# Patient Record
Sex: MALE | Race: ASIAN | NOT HISPANIC OR LATINO | Employment: FULL TIME | URBAN - METROPOLITAN AREA
[De-identification: names, ages, dates, MRNs, and addresses within clinical notes are randomized per-mention and may not be internally consistent; named-entity substitution may affect disease eponyms.]

---

## 2019-08-13 ENCOUNTER — COMMUNICATION - HEALTHEAST (OUTPATIENT)
Dept: UROLOGY | Facility: CLINIC | Age: 34
End: 2019-08-13

## 2019-08-16 ENCOUNTER — OFFICE VISIT - HEALTHEAST (OUTPATIENT)
Dept: UROLOGY | Facility: CLINIC | Age: 34
End: 2019-08-16

## 2019-08-16 DIAGNOSIS — N20.1 CALCULUS OF URETER: ICD-10-CM

## 2019-08-16 LAB
ALBUMIN UR-MCNC: NEGATIVE MG/DL
APPEARANCE UR: ABNORMAL
BILIRUB UR QL STRIP: NEGATIVE
COLOR UR AUTO: YELLOW
GLUCOSE UR STRIP-MCNC: NEGATIVE MG/DL
HGB UR QL STRIP: ABNORMAL
KETONES UR STRIP-MCNC: NEGATIVE MG/DL
LEUKOCYTE ESTERASE UR QL STRIP: NEGATIVE
NITRATE UR QL: NEGATIVE
PH UR STRIP: 5.5 [PH] (ref 5–8)
SP GR UR STRIP: 1.02 (ref 1–1.03)
UROBILINOGEN UR STRIP-ACNC: ABNORMAL

## 2019-08-30 ENCOUNTER — OFFICE VISIT - HEALTHEAST (OUTPATIENT)
Dept: UROLOGY | Facility: CLINIC | Age: 34
End: 2019-08-30

## 2019-08-30 DIAGNOSIS — N20.1 CALCULUS OF URETER: ICD-10-CM

## 2019-08-30 LAB
ALBUMIN UR-MCNC: NEGATIVE MG/DL
APPEARANCE UR: CLEAR
BILIRUB UR QL STRIP: NEGATIVE
COLOR UR AUTO: YELLOW
GLUCOSE UR STRIP-MCNC: NEGATIVE MG/DL
HGB UR QL STRIP: ABNORMAL
KETONES UR STRIP-MCNC: NEGATIVE MG/DL
LEUKOCYTE ESTERASE UR QL STRIP: NEGATIVE
NITRATE UR QL: NEGATIVE
PH UR STRIP: 5.5 [PH] (ref 5–8)
SP GR UR STRIP: 1.02 (ref 1–1.03)
UROBILINOGEN UR STRIP-ACNC: ABNORMAL

## 2019-09-13 ENCOUNTER — OFFICE VISIT - HEALTHEAST (OUTPATIENT)
Dept: UROLOGY | Facility: CLINIC | Age: 34
End: 2019-09-13

## 2019-09-13 ENCOUNTER — HOSPITAL ENCOUNTER (OUTPATIENT)
Dept: CT IMAGING | Facility: CLINIC | Age: 34
Discharge: HOME | End: 2019-09-13
Attending: SPECIALIST

## 2019-09-13 DIAGNOSIS — N20.1 CALCULUS OF URETER: ICD-10-CM

## 2019-09-13 DIAGNOSIS — N20.0 CALCULUS OF KIDNEY: ICD-10-CM

## 2021-05-26 VITALS — SYSTOLIC BLOOD PRESSURE: 118 MMHG | HEART RATE: 83 BPM | DIASTOLIC BLOOD PRESSURE: 83 MMHG | TEMPERATURE: 97.9 F

## 2021-05-31 NOTE — PROGRESS NOTES
Assessment/Plan:        Diagnoses and all orders for this visit:    Calculus of ureter  -     Urinalysis Macroscopic  -     Symptom Control While Passing a Stone Education  -     Cancel: CT Abdomen Pelvis Without Oral Without IV Contrast; Future; Expected date: 08/30/2019  -     Stone Analysis - Standing; Standing  -     Patient Stated Goal: Pass my stone      Stone Management Plan  [facility] 8/16/2019   Urinary Tract Infection No suspicion of infection   Renal Colic Well controlled symptoms   Renal Failure No suspicion of renal failure   Current CT date 8/13/2019   Right sided stones? Yes   R Number of ureteral stones No ureteral stones   R Number of kidney stones  2   R GSD of kidney stones < 2   R Hydronephrosis None   R Current Plan Observe   Observe rationale Limited stone burden with good prognosis for spontaneous passage   Left sided stones? Yes   L Number of ureteral stones 1   L GSD of ureteral stones 4   L Location of ureteral stone Proximal   L Number of kidney stones  No renal stones   L Hydronephrosis Mild   L Stone Event New event   Diagnosis date 8/13/2019   Initial location of primary symptomatic stone Proximal   Initial GSD of primary symptomatic stone 4   L MET Status Initiation   L Current Plan MET             Subjective:      HPI  Mr. Elia Warner is a 34 y.o.  male presenting to the [facility] [place] with history of onset of lower back pain with subsequent left flank pain acute on 8/13/2019.  Patient presented to the emergency room where he was found UA specific gravity 1.030 pH 6 greater than 100 RBCs 0-5 WBCs few bacteria negative nitrate.  C-reactive protein 0.2 white blood count 8000 hemoglobin 16.5 sodium 141 potassium 3.6 creatinine 1.14 calcium 9.3.    Patient's pain was controlled and CT scan obtained.    Personal review of CT scan without contrast obtained 8/13/2019 demonstrates a 2.5 x 3 mm stone left proximal ureter just below UPJ.  The right kidney  contains 2 small 1 mm stones in the right.    Patient comes at this time with symptoms completely abated no voiding symptoms no abdominal or flank pain.    UA today specific gravity 1.025 pH 5.5 large blood negative nitrate negative leukocyte.    Should be noted this is patient's first stone and family history is negative for stones.    Impression;    left proximal ureteral stone 2.5 x 3 mm with two 1 mm right renal stones    Plan continue trial of passage patient has protocol meds available to him including acetaminophen Dramamine ibuprofen and oxycodone.  He does not have Zofran and has had no nausea  Follow-up 2 weeks.  No CT has been ordered as anticipate he will pass stone.  If stone is not been recovered decision will be made whether to do CT scan at the time of his return.    Recommended stone risk evaluation upon resolution of the acute process.       ROS   Review of Systems  A 12 point comprehensive review of systems is negative except for HPI    No past medical history on file.    No past surgical history on file.    Current Outpatient Medications   Medication Sig Dispense Refill     acetaminophen (TYLENOL) 500 MG tablet Take 2 tablets (1,000 mg total) by mouth 4 (four) times a day for 7 days. 56 tablet 0     dimenhyDRINATE (DRAMAMINE) 50 MG tablet Take 1 tablet (50 mg total) by mouth at bedtime for 7 days. 7 tablet 0     dimenhyDRINATE (DRAMAMINE) 50 MG tablet Take 1 tablet (50 mg total) by mouth 4 (four) times a day as needed. 28 tablet 0     ibuprofen (ADVIL,MOTRIN) 200 MG tablet Take 2 tablets (400 mg total) by mouth 4 (four) times a day for 7 days. 56 tablet 0     oxyCODONE (ROXICODONE) 5 MG immediate release tablet Every 4-6 hours as needed if pain is not improved with acetaminophen and ibuprofen. 12 tablet 0     No current facility-administered medications for this visit.        No Known Allergies    Social History     Socioeconomic History     Marital status: Single     Spouse name: Not on file      Number of children: Not on file     Years of education: Not on file     Highest education level: Not on file   Occupational History     Not on file   Social Needs     Financial resource strain: Not on file     Food insecurity:     Worry: Not on file     Inability: Not on file     Transportation needs:     Medical: Not on file     Non-medical: Not on file   Tobacco Use     Smoking status: Not on file   Substance and Sexual Activity     Alcohol use: Not on file     Drug use: Not on file     Sexual activity: Not on file   Lifestyle     Physical activity:     Days per week: Not on file     Minutes per session: Not on file     Stress: Not on file   Relationships     Social connections:     Talks on phone: Not on file     Gets together: Not on file     Attends Mosque service: Not on file     Active member of club or organization: Not on file     Attends meetings of clubs or organizations: Not on file     Relationship status: Not on file     Intimate partner violence:     Fear of current or ex partner: Not on file     Emotionally abused: Not on file     Physically abused: Not on file     Forced sexual activity: Not on file   Other Topics Concern     Not on file   Social History Narrative     Not on file       No family history on file.    Objective:      Physical Exam  Vitals:    08/16/19 0851   BP: 116/70   Pulse: 79   Temp: 97.8  F (36.6  C)     General - well developed, well nourished, appropriate for age. Appears no distress at this time   Heart - regular rate and rhythm, no murmur  Respiratory - normal effort, clear to auscultation, good air entry without adventitious noises  Abdomen - slender soft, non-tender, no hepatosplenomegaly, no masses.   - no flank tenderness, no suprapubic tenderness, kidney and bladder non-palpable  MSK - normal spinal curvature. no spinal tenderness. normal gait. muscular strength intact.  Neurology - cranial nerves II-XII grossly intact, normal sensation, no unsteadiness  Skin -  intact, no bruising, no gouty tophi  Psych - oriented to time, place, and person, normal mood and affect.      Labs  Urinalysis POC (Office):  Nitrite, UA   Date Value Ref Range Status   08/16/2019 Negative Negative Final   08/13/2019 Negative Negative Final   01/16/2013 Negative (Negative) Final       Lab Urinalysis:  Blood, UA   Date Value Ref Range Status   08/16/2019 Large (!) Negative Final   08/13/2019 Large (!) Negative Final   01/16/2013 Negative (Negative) Final     Nitrite, UA   Date Value Ref Range Status   08/16/2019 Negative Negative Final   08/13/2019 Negative Negative Final   01/16/2013 Negative (Negative) Final     Leukocytes, UA   Date Value Ref Range Status   08/16/2019 Negative Negative Final   08/13/2019 Negative Negative Final   01/16/2013 Negative (Negative) Final     pH, UA   Date Value Ref Range Status   08/16/2019 5.5 5.0 - 8.0 Final   08/13/2019 6.0 4.5 - 8.0 Final   01/16/2013 5.0 4.5 - 8.0 Final    and Acute Labs   CBC   WBC   Date Value Ref Range Status   08/13/2019 8.0 4.0 - 11.0 thou/uL Final   01/17/2013 7.8 4.0 - 11.0 thou/uL Final   01/16/2013 11.5 (H) 4.0 - 11.0 thou/uL Final     Hemoglobin   Date Value Ref Range Status   08/13/2019 16.5 14.0 - 18.0 g/dL Final   01/17/2013 14.4 14.0 - 18.0 g/dL Final   01/16/2013 16.3 14.0 - 18.0 g/dL Final     Platelets   Date Value Ref Range Status   08/13/2019 214 140 - 440 thou/uL Final   01/17/2013 182 140 - 440 thou/uL Final   01/16/2013 225 140 - 440 thou/uL Final   , C Reactive Protein    CRP   Date Value Ref Range Status   08/13/2019 0.2 0.0 - 0.8 mg/dL Final    and Renal Panel  [facility]  Creatinine   Date Value Ref Range Status   08/13/2019 1.14 0.70 - 1.30 mg/dL Final   01/16/2013 1.26 0.70 - 1.30 mg/dL Final     Potassium   Date Value Ref Range Status   08/13/2019 3.6 3.5 - 5.0 mmol/L Final   01/16/2013 4.1 3.5 - 5.0 mmol/L Final     Calcium   Date Value Ref Range Status   08/13/2019 9.3 8.5 - 10.5 mg/dL Final   01/16/2013 9.8 8.5 - 10.5  mg/dL Final

## 2021-05-31 NOTE — TELEPHONE ENCOUNTER
Message left for patient to call clinic to set up an appointment for kidney stone follow-up.  Tatianna Bell RN

## 2021-05-31 NOTE — PROGRESS NOTES
Assessment/Plan:        Diagnoses and all orders for this visit:    Calculus of ureter  -     Urinalysis Macroscopic  -     Symptom Control While Passing a Stone Education  -     CT Abdomen Pelvis Without Oral Without IV Contrast; Future; Expected date: 09/13/2019  -     Patient Stated Goal: Pass my stone      Stone Management Plan  [facility] 8/16/2019 8/30/2019   Urinary Tract Infection No suspicion of infection No suspicion of infection   Renal Colic Well controlled symptoms Well controlled symptoms   Renal Failure No suspicion of renal failure No suspicion of renal failure   Current CT date 8/13/2019 -   Right sided stones? Yes -   R Number of ureteral stones No ureteral stones -   R Number of kidney stones  2 -   R GSD of kidney stones < 2 -   R Hydronephrosis None -   R Stone Event - No current event   R Current Plan Observe -   Observe rationale Limited stone burden with good prognosis for spontaneous passage -   Left sided stones? Yes -   L Number of ureteral stones 1 -   L GSD of ureteral stones 4 -   L Location of ureteral stone Proximal -   L Number of kidney stones  No renal stones -   L Hydronephrosis Mild -   L Stone Event New event Established event   Diagnosis date 8/13/2019 -   Initial location of primary symptomatic stone Proximal -   Initial GSD of primary symptomatic stone 4 -   L MET Status Initiation Progression   L Current Plan MET -             Subjective:      HPI  Mr. Elia Warner is a 34 y.o.  male returning to the [facility] [place] for follow-up of left proximal ureteral stone 2.5 x 3 mm diagnosed by CT 8/13/2019.  He has known 2 stones in the right kidney 1 mm each.    In the interval patient has done well but yesterday had a sensation of urgency and void.  This is somewhat better today.  Advised in the syndicate stone was in the intramural tunnel.  He says is been straining his urine.  UA today specific gravity 1.025 pH 5.5 moderate blood negative nitrate  negative leukocyte.   No imaging done today     Impression left ureteral stone probably an intramural tunnel at this time      plan; follow-up 2 weeks with CT if stone is not passed.  Otherwise of its past he will drop it off for analysis and will make plans for possible stone risk evaluation depending on analysis of stone.       ROS   Review of systems is negative except for HPI.    Past Medical History:   Diagnosis Date     Kidney stone        Past Surgical History:   Procedure Laterality Date     APPENDECTOMY         No current outpatient medications on file.     No current facility-administered medications for this visit.        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   Social Needs     Financial resource strain: Not on file     Food insecurity:     Worry: Not on file     Inability: Not on file     Transportation needs:     Medical: Not on file     Non-medical: Not on file   Tobacco Use     Smoking status: Current Some Day Smoker     Smokeless tobacco: Never Used   Substance and Sexual Activity     Alcohol use: Yes     Comment: occas     Drug use: Not on file     Sexual activity: Not on file   Lifestyle     Physical activity:     Days per week: Not on file     Minutes per session: Not on file     Stress: Not on file   Relationships     Social connections:     Talks on phone: Not on file     Gets together: Not on file     Attends Mu-ism service: Not on file     Active member of club or organization: Not on file     Attends meetings of clubs or organizations: Not on file     Relationship status: Not on file     Intimate partner violence:     Fear of current or ex partner: Not on file     Emotionally abused: Not on file     Physically abused: Not on file      Forced sexual activity: Not on file   Other Topics Concern     Not on file   Social History Narrative     Not on file       Family History   Problem Relation Age of Onset     Cancer Sister         breast     Urolithiasis Neg Hx      Clotting disorder Neg Hx      Diabetes Neg Hx      Gout Neg Hx      Heart disease Neg Hx      Objective:      Physical Exam  Vitals:    08/30/19 0908   BP: 115/72   Pulse: 73   Temp: 97.9  F (36.6  C)     General - well developed, well nourished, appropriate for age. Appears no distress at this time  Abdomen - slender soft, non-tender, no hepatosplenomegaly, no masses.   - no flank tenderness, no suprapubic tenderness, kidney and bladder non-palpable  MSK - normal spinal curvature. no spinal tenderness. normal gait. muscular strength intact.  Psych - oriented to time, place, and person, normal mood and affect.      Labs   Urinalysis POC (Office):  Nitrite, UA   Date Value Ref Range Status   08/30/2019 Negative Negative Final   08/16/2019 Negative Negative Final   08/13/2019 Negative Negative Final       Lab Urinalysis:  Blood, UA   Date Value Ref Range Status   08/30/2019 Moderate (!) Negative Final   08/16/2019 Large (!) Negative Final   08/13/2019 Large (!) Negative Final     Nitrite, UA   Date Value Ref Range Status   08/30/2019 Negative Negative Final   08/16/2019 Negative Negative Final   08/13/2019 Negative Negative Final     Leukocytes, UA   Date Value Ref Range Status   08/30/2019 Negative Negative Final   08/16/2019 Negative Negative Final   08/13/2019 Negative Negative Final     pH, UA   Date Value Ref Range Status   08/30/2019 5.5 5.0 - 8.0 Final   08/16/2019 5.5 5.0 - 8.0 Final   08/13/2019 6.0 4.5 - 8.0 Final

## 2021-05-31 NOTE — PATIENT INSTRUCTIONS - HE
Patient Stated Goal: Pass my stone  Symptom Control While Passing A Stone    The goal of [facility] is to let a smaller kidney stone (less than 4 to 5 mm) pass without intervention if possible. Giving your body a chance to clear the stone may take a few hours up to a few weeks.  Keeping you well-informed, safe and fairly comfortable is important.    Drink to thirst  Do not attempt to  flush out  your stone by drinking too much fluid. This does not work and may increase nausea. Drink enough to satisfy your body s thirst. Eating your normal diet is fine.   Medications (that may be suggested or prescribed)    Ibuprofen (Advil or Motrin) Available over the counter  o Take two (200mg) tablets every six hours until the stone passes.  o Prevents spasm of the ureter.    o Decreases pain.      Dramamine* (drowsy version, non-generic formulation) Available over the counter  o Take 50mg at bedtime  o Decreases spasms of the ureter  o Decreases nausea  o Decreases acute pain  o Decreases recurrence of pain for 24 hours  o Will help you sleep  *This medication will cause increase drowsiness, do not drive or operate machinery for 6 hours.      Narcotics (Percocet, Vicodin, Dilaudid) Take as prescribed for severe pain unrelieved by ibuprofen and Dramamine  o Narcotics have significant side effects and only  cover-up  pain. They have no effect on the cause of pain.  o Common side effects  - Confusion, disorientation and sedation - DO NOT DRIVE OR OPERATE MACHINERY WITHIN 24 HOURS  - Nausea - take Dramamine or Zofran or Haldol to help control  - Constipation  - Sleep disturbances      Ondansetron (Zofran) Take as prescribed  o Reserve for severe nausea  o May cause constipation, start over the counter Miralax if needed      Second Line Anti-Nausea Medication: Adding a different anti-nausea medication maybe helpful for persistent nausea.  The combined effect of different types of anti-nausea medications maybe more  effective than either medication by itself, even in higher doses.  o Compazine: Take as prescribed      Information about kidney stones    Crystals can form if chemicals are too concentrated in your urine. If the crystal grows over time, a stone may form. A stone usually isn t painful while it is still in the kidney.    As the stone begins to leave the kidney, you may experience episodes of flank pain as the kidney stone approaches the entrance to the ureter. Some people feel a vague ache in the side.    Kidney stones may fall into the ureter. Some stones are tiny and pass through without causing symptoms. The ureter is a small tube (approximately 1/8 of an inch wide). A kidney stone can get stuck and block the ureter. If this happens, urine backs up and flows back to the kidney. Back pressure on the kidney can cause:  o Severe flank pain radiating to the groin.  o Severe nausea and vomiting.  o The pain can occur in the lower back, side, groin or all three.      When the stone reaches the lower ureter, this can irritate the bladder and sensations of feeling the urge to urinate frequently and urgently may occur.      Once the stone passes out of your ureter and into your bladder, the symptoms of urgency and frequency will often disappear. Sometimes pain will come back for a short period and will not be as severe as before. The passage of the stone from your bladder and out of your body is usually not a problem. The urethra is at least twice as wide as the normal ureter, so the stone doesn t usually block it.    Strain all urine  If you pass the stone, save it. Place it in the container we have provided and bring it to the [facility] [place] within a week of passing it. Your stone will then be sent for analysis which takes about a month.     Signs and symptoms you might experience    Nausea    Decreased appetite    Urinary frequency    Bloody urine     Chills    Fatigue    When to call [facility] [place] or  go to the Emergency Room    Fever with a temperature greater than 100.1    Severe pain    Persistent nausea/vomiting    If the pain worsens or nausea/vomiting is uncontrolled with medications, STOP eating & drinking. You need to have an empty stomach for 8 hours prior to surgery. Call the [facility] [place] immediately at [phone].           Follow-up    Low dose CT scan with doctor visit 1-2 weeks after initial clinic visit per doctor s instructions    Please cancel the CT scan visit if you pass a stone. Reschedule for a one month follow-up with doctor to discuss stone composition and future prevention.    Preventing future stones    Approximately a month after your stone is sent out for analysis, a prevention visit will occur with your provider, to discuss an individualized plan for prevention of new stones and to discuss managing stones that you may still have. Along with the analysis of the kidney stone, blood and urine tests may be indicated to develop this plan. Knowing the type of kidney stones you make, and why, allows the providers at the [facility] to recommend specific ways to prevent them.    Follow-up visits are an important part of monitoring and preventing future re-occurrences.    The [facility] [place] is available for questions or concerns 24 hours a day at [phone]

## 2021-05-31 NOTE — PATIENT INSTRUCTIONS - HE
Patient Stated Goal: Pass my stone  Symptom Control While Passing A Stone    The goal of [facility] is to let a smaller kidney stone (less than 4 to 5 mm) pass without intervention if possible. Giving your body a chance to clear the stone may take a few hours up to a few weeks.  Keeping you well-informed, safe and fairly comfortable is important.    Drink to thirst  Do not attempt to  flush out  your stone by drinking too much fluid. This does not work and may increase nausea. Drink enough to satisfy your body s thirst. Eating your normal diet is fine.   Medications (that may be suggested or prescribed)    Ibuprofen (Advil or Motrin) Available over the counter  o Take two (200mg) tablets every six hours until the stone passes.  o Prevents spasm of the ureter.    o Decreases pain.      Dramamine* (drowsy version, non-generic formulation) Available over the counter  o Take 50mg at bedtime  o Decreases spasms of the ureter  o Decreases nausea  o Decreases acute pain  o Decreases recurrence of pain for 24 hours  o Will help you sleep  *This medication will cause increase drowsiness, do not drive or operate machinery for 6 hours.      Narcotics (Percocet, Vicodin, Dilaudid) Take as prescribed for severe pain unrelieved by ibuprofen and Dramamine  o Narcotics have significant side effects and only  cover-up  pain. They have no effect on the cause of pain.  o Common side effects  - Confusion, disorientation and sedation - DO NOT DRIVE OR OPERATE MACHINERY WITHIN 24 HOURS  - Nausea - take Dramamine or Zofran or Haldol to help control  - Constipation  - Sleep disturbances      Ondansetron (Zofran) Take as prescribed  o Reserve for severe nausea  o May cause constipation, start over the counter Miralax if needed      Second Line Anti-Nausea Medication: Adding a different anti-nausea medication maybe helpful for persistent nausea.  The combined effect of different types of anti-nausea medications maybe more  effective than either medication by itself, even in higher doses.  o Compazine: Take as prescribed      Information about kidney stones    Crystals can form if chemicals are too concentrated in your urine. If the crystal grows over time, a stone may form. A stone usually isn t painful while it is still in the kidney.    As the stone begins to leave the kidney, you may experience episodes of flank pain as the kidney stone approaches the entrance to the ureter. Some people feel a vague ache in the side.    Kidney stones may fall into the ureter. Some stones are tiny and pass through without causing symptoms. The ureter is a small tube (approximately 1/8 of an inch wide). A kidney stone can get stuck and block the ureter. If this happens, urine backs up and flows back to the kidney. Back pressure on the kidney can cause:  o Severe flank pain radiating to the groin.  o Severe nausea and vomiting.  o The pain can occur in the lower back, side, groin or all three.      When the stone reaches the lower ureter, this can irritate the bladder and sensations of feeling the urge to urinate frequently and urgently may occur.      Once the stone passes out of your ureter and into your bladder, the symptoms of urgency and frequency will often disappear. Sometimes pain will come back for a short period and will not be as severe as before. The passage of the stone from your bladder and out of your body is usually not a problem. The urethra is at least twice as wide as the normal ureter, so the stone doesn t usually block it.    Strain all urine  If you pass the stone, save it. Place it in the container we have provided and bring it to the [facility] within a week of passing it. Your stone will then be sent for analysis which takes about a month.     Signs and symptoms you might experience    Nausea    Decreased appetite    Urinary frequency    Bloody urine     Chills    Fatigue    When to call [facility] or  go to the Emergency Room    Fever with a temperature greater than 100.1    Severe pain    Persistent nausea/vomiting    If the pain worsens or nausea/vomiting is uncontrolled with medications, STOP eating & drinking. You need to have an empty stomach for 8 hours prior to surgery. Call the [facility] immediately at [phone].           Follow-up    Low dose CT scan with doctor visit 1-2 weeks after initial clinic visit per doctor s instructions    Please cancel the CT scan visit if you pass a stone. Reschedule for a one month follow-up with doctor to discuss stone composition and future prevention.    Preventing future stones    Approximately a month after your stone is sent out for analysis, a prevention visit will occur with your provider, to discuss an individualized plan for prevention of new stones and to discuss managing stones that you may still have. Along with the analysis of the kidney stone, blood and urine tests may be indicated to develop this plan. Knowing the type of kidney stones you make, and why, allows the providers at the [facility] to recommend specific ways to prevent them.    Follow-up visits are an important part of monitoring and preventing future re-occurrences.    The [facility] is available for questions or concerns 24 hours a day at [phone]

## 2021-06-01 NOTE — PATIENT INSTRUCTIONS - HE
Patient Stated Goal: Prevent further stones  Steps for collecting a 24 hour urine specimen    Please follow the directions carefully. All urine voided for a 24-hour period needs to be collected into the jug.  DO NOT change any of your  normal  daily habits when doing this test. Continue to follow your regular diet, intake of fluids, and usual activity level. Pick the most convenient day with your schedule, perhaps on a weekend or a day off.    Start your Diet Log the day before collection and continue on the day of urine collection.  You MUST bring Diet Log with you on follow up visit to discuss results.    One 24hr Urine Collection     Two 24hr Urine Collections  (do not collect on consecutive days)    PLEASE COMPLETE THE 2nd JUG WITHIN 1-2 WEEKS FROM THE 1st JUG    STEP 1  Empty your bladder completely into the toilet. This will be your start time. Write your full legal name, start date and time on the jug label.  Collection start and stop times need to match exactly!  For example:  6 am to 6 am.    STEP 2  The next time you urinate, empty your bladder directly into the jug or collection hat and pour urine into the jug.  Screw the lid back onto the jug.  Do not spill!    STEP 3  Place the jug in the refrigerator or a cooler with ice during the collection period.  Failure to keep it cool could cause inaccurate test results. DO NOT Freeze.    STEP 4  Continue collecting all urine into the jug for the rest of the day, for the full 24 hours.  DO NOT stop early or go over 24 hours!    STEP 5  Exactly 24 hours from start of collection, write your full legal name, stop date and time on the jug label.   Collection start and stop times need to match exactly!  For example:  6 am to 6 am.  Failure to label correctly will result in recollection of urine specimen.    STEP 6  Return each jug within 24 hours after final urination.     STEP 7  Drop off jug locations:   [facility]: Mon-Fri 7am-7pm - Closed on  weekends  [facility]: Mon-Fri 7am-5pm - Closed on Sunday  [facility]: Mon-Fri 7am-6:30pm - Closed on weekends    STEP 8  Please call [facility] after return of your final jug to schedule your follow-up visit. [phone]

## 2021-06-01 NOTE — PROGRESS NOTES
Assessment/Plan:        Diagnoses and all orders for this visit:    Calculus of ureter  -     Urinalysis Macroscopic  -     Magnesium, 24 Hour Urine; Future  -     Stone Formation, 24 Hour Urine (does not include Magnesium); Standing  -     Renal Function Profile; Future; Expected date: 09/27/2019  -     Uric Acid; Future; Expected date: 09/27/2019  -     Magnesium; Future; Expected date: 09/27/2019  -     Calcium, Ionized, Measured; Future; Expected date: 09/27/2019  -     Parathyroid Hormone Intact with Minerals; Future; Expected date: 09/27/2019  -     Patient Stated Goal: Prevent further stones  -     24 Hour Urine Collection Steps Education    Calculus of kidney  -     Magnesium, 24 Hour Urine; Future  -     Stone Formation, 24 Hour Urine (does not include Magnesium); Standing  -     Renal Function Profile; Future; Expected date: 09/27/2019  -     Uric Acid; Future; Expected date: 09/27/2019  -     Magnesium; Future; Expected date: 09/27/2019  -     Calcium, Ionized, Measured; Future; Expected date: 09/27/2019  -     Parathyroid Hormone Intact with Minerals; Future; Expected date: 09/27/2019  -     Patient Stated Goal: Prevent further stones  -     24 Hour Urine Collection Steps Education      Stone Management Plan  [facility] 8/16/2019 8/30/2019 9/13/2019   Urinary Tract Infection No suspicion of infection No suspicion of infection No suspicion of infection   Renal Colic Well controlled symptoms Well controlled symptoms Asymptomatic at this time   Renal Failure No suspicion of renal failure No suspicion of renal failure No suspicion of renal failure   Current CT date 8/13/2019 - 9/13/2019   Right sided stones? Yes - Yes   R Number of ureteral stones No ureteral stones - -   R Number of kidney stones  2 - -   R GSD of kidney stones < 2 - -   R Hydronephrosis None - None   R Stone Event - No current event -   R Current Plan Observe - Observe   Observe rationale Limited stone burden with good prognosis  for spontaneous passage - Limited stone burden with good prognosis for spontaneous passage   Left sided stones? Yes - No   L Number of ureteral stones 1 - -   L GSD of ureteral stones 4 - -   L Location of ureteral stone Proximal - -   L Number of kidney stones  No renal stones - -   L Hydronephrosis Mild - -   L Stone Event New event Established event Established event   Diagnosis date 8/13/2019 - -   Initial location of primary symptomatic stone Proximal - -   Initial GSD of primary symptomatic stone 4 - -   L MET Status Initiation Progression Passed   L Current Plan MET - -             Subjective:      HPI  Mr. Elia Warner is a 34 y.o. [place] male returning to the [facility] for follow-up left proximal ureteral stone diagnosed 8/13/2019.  Patient had intermittent symptoms leading up to 8/30/2019 appointment consisting of feeling of urgency and some mild dysuria.  No CT was accomplished.  Should be noted on his 8/13/2019 study had 2 small stones in the right kidney 1 mm each.    Patient comes today having had symptoms of urgency frequency that suddenly abated the day after he was seen on August 30, 2019.  He has been asymptomatic since then.    Patient comes today without urgency frequency abdominal or flank pain.    UA today specific gravity 1.025 pH 5.5- blood nitrate and leukocyte.    Personal review of CT scan without contrast low-dose obtained today 9/13/2019 demonstrates no evidence of stones right or left but this 1 mm stones easily could be missed in the right kidney that were previously seen.  Certainly there was no ureteral stones.    Impression; spontaneous passage left ureteral stone not recovered with history of two 1 mm right renal stones    Plan in view of youth recommended stone risk studies.  Patient was in agreement and will proceed with same disposition to follow.         ROS   Review of systems is negative except for HPI.    Past Medical History:   Diagnosis Date     Kidney  stone        Past Surgical History:   Procedure Laterality Date     APPENDECTOMY         No current outpatient medications on file.     No current facility-administered medications for this visit.        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   Social Needs     Financial resource strain: Not on file     Food insecurity:     Worry: Not on file     Inability: Not on file     Transportation needs:     Medical: Not on file     Non-medical: Not on file   Tobacco Use     Smoking status: Current Some Day Smoker     Smokeless tobacco: Never Used   Substance and Sexual Activity     Alcohol use: Yes     Comment: occas     Drug use: Not on file     Sexual activity: Not on file   Lifestyle     Physical activity:     Days per week: Not on file     Minutes per session: Not on file     Stress: Not on file   Relationships     Social connections:     Talks on phone: Not on file     Gets together: Not on file     Attends Sikhism service: Not on file     Active member of club or organization: Not on file     Attends meetings of clubs or organizations: Not on file     Relationship status: Not on file     Intimate partner violence:     Fear of current or ex partner: Not on file     Emotionally abused: Not on file     Physically abused: Not on file     Forced sexual activity: Not on file   Other Topics Concern     Not on file   Social History Narrative     Not on file       Family History   Problem Relation Age of Onset     Cancer Sister         breast     Urolithiasis Neg Hx      Clotting disorder Neg Hx      Diabetes Neg Hx      Gout Neg Hx      Heart disease Neg Hx      Objective:      Physical Exam  Vitals:    09/13/19 0915   BP: 118/83   Pulse: 83   Temp: 97.9  F (36.6  C)      General - well developed, well nourished, appropriate for age. Appears no distress at this time  Abdomen - slender soft, non-tender, no hepatosplenomegaly, no masses.   - no flank tenderness, no suprapubic tenderness, kidney and bladder non-palpable  MSK - normal spinal curvature. no spinal tenderness. normal gait. muscular strength intact.  Psych - oriented to time, place, and person, normal mood and affect.      Labs   Urinalysis POC (Office):  Nitrite, UA   Date Value Ref Range Status   09/13/2019 Negative Negative Final   08/30/2019 Negative Negative Final   08/16/2019 Negative Negative Final       Lab Urinalysis:  Blood, UA   Date Value Ref Range Status   09/13/2019 Trace (!) Negative Final   08/30/2019 Moderate (!) Negative Final   08/16/2019 Large (!) Negative Final     Nitrite, UA   Date Value Ref Range Status   09/13/2019 Negative Negative Final   08/30/2019 Negative Negative Final   08/16/2019 Negative Negative Final     Leukocytes, UA   Date Value Ref Range Status   09/13/2019 Negative Negative Final   08/30/2019 Negative Negative Final   08/16/2019 Negative Negative Final     pH, UA   Date Value Ref Range Status   09/13/2019 5.5 5.0 - 8.0 Final   08/30/2019 5.5 5.0 - 8.0 Final   08/16/2019 5.5 5.0 - 8.0 Final

## 2023-02-07 ENCOUNTER — HOSPITAL ENCOUNTER (EMERGENCY)
Facility: CLINIC | Age: 38
Discharge: HOME | End: 2023-02-07
Attending: EMERGENCY MEDICINE | Admitting: EMERGENCY MEDICINE
Payer: COMMERCIAL

## 2023-02-07 ENCOUNTER — APPOINTMENT (OUTPATIENT)
Dept: CT IMAGING | Facility: CLINIC | Age: 38
End: 2023-02-07
Attending: EMERGENCY MEDICINE
Payer: COMMERCIAL

## 2023-02-07 VITALS
DIASTOLIC BLOOD PRESSURE: 74 MMHG | HEIGHT: 62 IN | TEMPERATURE: 99.2 F | BODY MASS INDEX: 30.66 KG/M2 | SYSTOLIC BLOOD PRESSURE: 119 MMHG | OXYGEN SATURATION: 95 % | HEART RATE: 110 BPM | WEIGHT: 166.6 LBS | RESPIRATION RATE: 18 BRPM

## 2023-02-07 DIAGNOSIS — R10.84 ABDOMINAL PAIN, GENERALIZED: ICD-10-CM

## 2023-02-07 DIAGNOSIS — R11.2 NAUSEA AND VOMITING, UNSPECIFIED VOMITING TYPE: ICD-10-CM

## 2023-02-07 LAB
ALBUMIN SERPL-MCNC: 4.5 G/DL (ref 3.5–5)
ALP SERPL-CCNC: 73 U/L (ref 45–120)
ALT SERPL W P-5'-P-CCNC: 32 U/L (ref 0–45)
ANION GAP SERPL CALCULATED.3IONS-SCNC: 11 MMOL/L (ref 5–18)
AST SERPL W P-5'-P-CCNC: 23 U/L (ref 0–40)
BASOPHILS # BLD AUTO: 0 10E3/UL (ref 0–0.2)
BASOPHILS NFR BLD AUTO: 0 %
BILIRUB SERPL-MCNC: 1.3 MG/DL (ref 0–1)
BUN SERPL-MCNC: 22 MG/DL (ref 8–22)
CALCIUM SERPL-MCNC: 9.7 MG/DL (ref 8.5–10.5)
CHLORIDE BLD-SCNC: 105 MMOL/L (ref 98–107)
CO2 SERPL-SCNC: 25 MMOL/L (ref 22–31)
CREAT SERPL-MCNC: 1.27 MG/DL (ref 0.7–1.3)
EOSINOPHIL # BLD AUTO: 0.1 10E3/UL (ref 0–0.7)
EOSINOPHIL NFR BLD AUTO: 1 %
ERYTHROCYTE [DISTWIDTH] IN BLOOD BY AUTOMATED COUNT: 12.7 % (ref 10–15)
GFR SERPL CREATININE-BSD FRML MDRD: 75 ML/MIN/1.73M2
GLUCOSE BLD-MCNC: 130 MG/DL (ref 70–125)
HCT VFR BLD AUTO: 52.1 % (ref 40–53)
HGB BLD-MCNC: 17.3 G/DL (ref 13.3–17.7)
IMM GRANULOCYTES # BLD: 0 10E3/UL
IMM GRANULOCYTES NFR BLD: 0 %
LIPASE SERPL-CCNC: 26 U/L (ref 0–52)
LYMPHOCYTES # BLD AUTO: 1 10E3/UL (ref 0.8–5.3)
LYMPHOCYTES NFR BLD AUTO: 10 %
MCH RBC QN AUTO: 29.1 PG (ref 26.5–33)
MCHC RBC AUTO-ENTMCNC: 33.2 G/DL (ref 31.5–36.5)
MCV RBC AUTO: 88 FL (ref 78–100)
MONOCYTES # BLD AUTO: 0.5 10E3/UL (ref 0–1.3)
MONOCYTES NFR BLD AUTO: 4 %
NEUTROPHILS # BLD AUTO: 8.6 10E3/UL (ref 1.6–8.3)
NEUTROPHILS NFR BLD AUTO: 85 %
NRBC # BLD AUTO: 0 10E3/UL
NRBC BLD AUTO-RTO: 0 /100
PLATELET # BLD AUTO: 237 10E3/UL (ref 150–450)
POTASSIUM BLD-SCNC: 3.7 MMOL/L (ref 3.5–5)
PROT SERPL-MCNC: 7.7 G/DL (ref 6–8)
RBC # BLD AUTO: 5.95 10E6/UL (ref 4.4–5.9)
SODIUM SERPL-SCNC: 141 MMOL/L (ref 136–145)
WBC # BLD AUTO: 10.3 10E3/UL (ref 4–11)

## 2023-02-07 PROCEDURE — 96374 THER/PROPH/DIAG INJ IV PUSH: CPT | Mod: 59

## 2023-02-07 PROCEDURE — 36415 COLL VENOUS BLD VENIPUNCTURE: CPT | Performed by: EMERGENCY MEDICINE

## 2023-02-07 PROCEDURE — 80053 COMPREHEN METABOLIC PANEL: CPT | Performed by: EMERGENCY MEDICINE

## 2023-02-07 PROCEDURE — 96361 HYDRATE IV INFUSION ADD-ON: CPT

## 2023-02-07 PROCEDURE — 83690 ASSAY OF LIPASE: CPT | Performed by: EMERGENCY MEDICINE

## 2023-02-07 PROCEDURE — 74177 CT ABD & PELVIS W/CONTRAST: CPT

## 2023-02-07 PROCEDURE — 250N000011 HC RX IP 250 OP 636: Performed by: EMERGENCY MEDICINE

## 2023-02-07 PROCEDURE — 96375 TX/PRO/DX INJ NEW DRUG ADDON: CPT

## 2023-02-07 PROCEDURE — 99285 EMERGENCY DEPT VISIT HI MDM: CPT | Mod: 25

## 2023-02-07 PROCEDURE — 85025 COMPLETE CBC W/AUTO DIFF WBC: CPT | Performed by: EMERGENCY MEDICINE

## 2023-02-07 PROCEDURE — 258N000003 HC RX IP 258 OP 636: Performed by: EMERGENCY MEDICINE

## 2023-02-07 RX ORDER — ONDANSETRON 4 MG/1
4 TABLET, ORALLY DISINTEGRATING ORAL EVERY 8 HOURS PRN
Qty: 10 TABLET | Refills: 0 | Status: SHIPPED | OUTPATIENT
Start: 2023-02-07 | End: 2023-02-10

## 2023-02-07 RX ORDER — IOPAMIDOL 755 MG/ML
100 INJECTION, SOLUTION INTRAVASCULAR ONCE
Status: COMPLETED | OUTPATIENT
Start: 2023-02-07 | End: 2023-02-07

## 2023-02-07 RX ORDER — KETOROLAC TROMETHAMINE 15 MG/ML
15 INJECTION, SOLUTION INTRAMUSCULAR; INTRAVENOUS ONCE
Status: COMPLETED | OUTPATIENT
Start: 2023-02-07 | End: 2023-02-07

## 2023-02-07 RX ORDER — ONDANSETRON 2 MG/ML
4 INJECTION INTRAMUSCULAR; INTRAVENOUS ONCE
Status: COMPLETED | OUTPATIENT
Start: 2023-02-07 | End: 2023-02-07

## 2023-02-07 RX ADMIN — IOPAMIDOL 100 ML: 755 INJECTION, SOLUTION INTRAVENOUS at 08:24

## 2023-02-07 RX ADMIN — ONDANSETRON 4 MG: 2 INJECTION INTRAMUSCULAR; INTRAVENOUS at 06:24

## 2023-02-07 RX ADMIN — SODIUM CHLORIDE 1000 ML: 9 INJECTION, SOLUTION INTRAVENOUS at 06:24

## 2023-02-07 RX ADMIN — PROCHLORPERAZINE EDISYLATE 10 MG: 5 INJECTION INTRAMUSCULAR; INTRAVENOUS at 07:46

## 2023-02-07 RX ADMIN — FAMOTIDINE 20 MG: 10 INJECTION, SOLUTION INTRAVENOUS at 06:25

## 2023-02-07 RX ADMIN — KETOROLAC TROMETHAMINE 15 MG: 15 INJECTION, SOLUTION INTRAMUSCULAR; INTRAVENOUS at 07:46

## 2023-02-07 ASSESSMENT — ENCOUNTER SYMPTOMS
CHILLS: 1
ABDOMINAL PAIN: 1
ROS GI COMMENTS: POSITIVE FOR ABDOMINAL BLOATING.
DIARRHEA: 1
FEVER: 0
VOMITING: 1
NAUSEA: 1

## 2023-02-07 ASSESSMENT — ACTIVITIES OF DAILY LIVING (ADL)
ADLS_ACUITY_SCORE: 35
ADLS_ACUITY_SCORE: 35

## 2023-02-07 NOTE — ED TRIAGE NOTES
"Here for abdominal pain and vomiting \"bile colored emesis\" that started last night. Describes pain as pressure, located in mid abdomen. Also reporting soft stools and runny nose.   Hx appendectomy   No medications PTA     Triage Assessment     Row Name 02/07/23 0608       Triage Assessment (Adult)    Airway WDL WDL       Respiratory WDL    Respiratory WDL WDL       Skin Circulation/Temperature WDL    Skin Circulation/Temperature WDL WDL       Cardiac WDL    Cardiac WDL X;rhythm    Cardiac Rhythm ST       Peripheral/Neurovascular WDL    Peripheral Neurovascular WDL WDL       Cognitive/Neuro/Behavioral WDL    Cognitive/Neuro/Behavioral WDL WDL              "

## 2023-02-07 NOTE — DISCHARGE INSTRUCTIONS
Abdominal CT scan and laboratory test all appear reassuring here today in the ED.  Your symptoms are likely due to a viral gastroenteritis.    Take the prescribed Zofran as needed for any further episodes of nausea or vomiting.  Use over-the-counter ibuprofen as needed for any further abdominal pain.  Continue to rest and drink plenty of fluids over the next few days.    Follow-up with your primary care provider for reevaluation as needed or return back to ED sooner for any worsening abdominal pain, vomiting, or any other new or concerning symptoms.

## 2023-02-07 NOTE — ED NOTES
Patient discharged home with MD note and notified of medication sent to pharmacy. Will take as prescribed. All questions answered. Vitals stable on RA.

## 2023-02-07 NOTE — Clinical Note
Elia Warner was seen and treated in our emergency department on 2/7/2023.  He may return to work on 02/10/2023.       If you have any questions or concerns, please don't hesitate to call.      Hannah Michelle, DO

## 2023-02-07 NOTE — ED PROVIDER NOTES
EMERGENCY DEPARTMENT ENCOUNTER      NAME: Elia Warner  AGE: 37 year old male  YOB: 1985  MRN: [id number]  EVALUATION DATE & TIME: 2023  6:11 AM    PCP: No primary care provider on file.    ED PROVIDER: Hannah Michelle DO      Chief Complaint   Patient presents with     Abdominal Pain         FINAL IMPRESSION:  1. Abdominal pain, generalized    2. Nausea and vomiting, unspecified vomiting type          ED COURSE & MEDICAL DECISION MAKIN-year-old male presented to the ED for evaluation of abdominal pain with associated vomiting and diarrhea that started last evening.  Here in the ED the patient was noted to be slightly tachycardic upon arrival.  Patient was otherwise hemodynamically stable.  On exam the patient does not have tachycardia and mild tenderness to palpation noted to the periumbilical region of his abdomen.  The patient did not have evidence of an acute abdomen, however.  The remainder of his physical exam was unremarkable.  Following his initial evaluation the patient was given IV fluids, Zofran, and famotidine.    CBC, BMP, hepatic panel, lipase were all reassuring.    Patient was reevaluated informed of the reassuring laboratory results.  At the time of reevaluation the patient stated that his abdominal pain and nausea had improved but were still persistent.  Patient was also noted to be slightly tachycardic still at the time of reevaluation.  Patient was then given a dose of IV Compazine and Toradol and abdominal CT scan was obtained for further evaluation.    CT scan of the abdomen shows nondilated fluid containing small bowel that could represent enteritis.  No other acute abnormalities were noted.    Patient was reevaluated and informed the abdominal CT scan results.  The patient states that the nausea and abdominal pain had improved with the additional Compazine and Toradol.  The patient was informed that his symptoms likely represent a viral gastroenteritis.  After educating  and reassuring the patient he felt comfortable returning home.  The patient was sent home with ODT Zofran to use as needed for any further episodes of nausea or vomiting.  He was instructed to rest and to drink plenty of fluids and to use over-the-counter ibuprofen as needed for any further abdominal pain.  The patient was instructed to follow-up with his primary care provider for reevaluation or to return back to ED sooner for any worsening abdominal pain, vomiting, fevers, or any other new or concerning symptoms.    Pertinent Labs & Imaging studies reviewed. (See chart for details)    6:13 AM I met with the patient to gather history and to perform my initial exam. We discussed plans for the ED course, including diagnostic testing and treatment.     Medical Decision Making    History:    Supplemental history from: Documented in chart, if applicable and N/A    External Record(s) reviewed: Documented in chart, if applicable.    Work Up:    Chart documentation includes differential considered and any EKGs or imaging independently interpreted by provider, where specified.    In additional to work up documented, I considered the following work up: Documented in chart, if applicable.      Complicating factors:    Care impacted by chronic illness: N/A    Care affected by social determinants of health: N/A    Disposition considerations: Discharge. I prescribed additional prescription strength medication(s) as charted. N/A.        At the conclusion of the encounter I discussed the results of all of the tests and the disposition. The questions were answered. The patient or family acknowledged understanding and was agreeable with the care plan.     PPE worn: n95 mask, goggles    MEDICATIONS GIVEN IN THE EMERGENCY:  Medications   0.9% sodium chloride BOLUS (0 mLs Intravenous Stopped 2/7/23 0978)   ondansetron (ZOFRAN) injection 4 mg (4 mg Intravenous Given 2/7/23 6485)   famotidine (PEPCID) injection 20 mg (20 mg Intravenous  "Given 2/7/23 0625)   ketorolac (TORADOL) injection 15 mg (15 mg Intravenous Given 2/7/23 0746)   prochlorperazine (COMPAZINE) injection 10 mg (10 mg Intravenous Given 2/7/23 0746)   iopamidol (ISOVUE-370) solution 100 mL (100 mLs Intravenous Given 2/7/23 0824)       NEW PRESCRIPTIONS STARTED AT TODAY'S ER VISIT  New Prescriptions    ONDANSETRON (ZOFRAN ODT) 4 MG ODT TAB    Take 1 tablet (4 mg) by mouth every 8 hours as needed for nausea        =================================================================    HPI    Patient information was obtained from: Patient    Use of : N/A      Elia Warner is a 37 year old male with pertinent history s/p appendectomy who presents to this ED for evaluation of mid abdominal pain.    Starting last night before he had went to sleep (2/06/23) the patient had developed mid abdominal pain. He describes the pain as constant and that it does not radiate anywhere. He also described his vomit as\" brown bile\". Sometime after he developed nausea and vomiting. He also endorsed chills, abdominal bloating, and two episodes of diarrhea.    He denied any recent medication changes/use of daily medications, blood in his vomit, fever or sick exposures. Patient reports a history of s/p appendectomy and that he smokes cigarettes and drinks alcohol occasionally. No other complaints at this time.    REVIEW OF SYSTEMS   Review of Systems   Constitutional: Positive for chills. Negative for fever.   Gastrointestinal: Positive for abdominal pain (mid), diarrhea, nausea and vomiting (brown bile).        Positive for abdominal bloating.   All other systems reviewed and are negative.       PAST MEDICAL HISTORY:  No past medical history on file.    PAST SURGICAL HISTORY:  Past Surgical History:   Procedure Laterality Date     APPENDECTOMY         CURRENT MEDICATIONS:    ondansetron (ZOFRAN ODT) 4 MG ODT tab        ALLERGIES:  No Known Allergies    FAMILY HISTORY:  Family History   Problem Relation " "Age of Onset     Cancer Sister         breast     Urolithiasis No family hx of      Clotting Disorder No family hx of      Diabetes No family hx of      Gout No family hx of      Heart Disease No family hx of        SOCIAL HISTORY:   Social History     Socioeconomic History     Marital status: Single   Tobacco Use     Smoking status: Some Days     Smokeless tobacco: Never   Substance and Sexual Activity     Alcohol use: Yes     Comment: Alcoholic Drinks/day: occas       VITALS:  /74   Pulse 110   Temp 99.2  F (37.3  C) (Temporal)   Resp 18   Ht 1.575 m (5' 2\")   Wt 75.6 kg (166 lb 9.6 oz)   SpO2 95%   BMI 30.47 kg/m      PHYSICAL EXAM    General presentation: Alert, Vital signs reviewed. NAD  HENT: ENT inspection is normal. Oropharynx is moist and clear.   Eye: Pupils are equal and reactive to light. EOMI  Neck: The neck is supple, with full ROM, with no evidence of meningismus.  Pulmonary: Currently in no acute respiratory distress. Normal, non labored respirations, the lung sounds are normal with good equal air movement. Clear to auscultation bilaterally.   Circulatory: Tachycardic. Normal rhythm. Peripheral pulses are strong and equal. No murmurs, rubs, or gallops.   Abdominal: The abdomen is soft. Mild periumbilical abdominal tenderness to palpation. No rigidity, guarding, or rebound. Bowel sounds normal.   Neurologic: Alert, oriented to person, place, and time. No motor deficit. No sensory deficit. Cranial nerves II through XII are intact.  Musculoskeletal: No extremity tenderness. Full range of motion in all extremities. No extremity edema.   Skin: Skin color is normal. No rash. Warm. Dry to touch.      LAB:  All pertinent labs reviewed and interpreted.  Results for orders placed or performed during the hospital encounter of 02/07/23   CT Abdomen Pelvis w Contrast    Impression    IMPRESSION:   1.  Nondilated fluid containing small bowel could represent enteritis.  2.  Minimal right middle lobe " groundglass opacity could represent atelectasis or very early pneumonia.  3.  Appendectomy.   Comprehensive metabolic panel   Result Value Ref Range    Sodium 141 136 - 145 mmol/L    Potassium 3.7 3.5 - 5.0 mmol/L    Chloride 105 98 - 107 mmol/L    Carbon Dioxide (CO2) 25 22 - 31 mmol/L    Anion Gap 11 5 - 18 mmol/L    Urea Nitrogen 22 8 - 22 mg/dL    Creatinine 1.27 0.70 - 1.30 mg/dL    Calcium 9.7 8.5 - 10.5 mg/dL    Glucose 130 (H) 70 - 125 mg/dL    Alkaline Phosphatase 73 45 - 120 U/L    AST 23 0 - 40 U/L    ALT 32 0 - 45 U/L    Protein Total 7.7 6.0 - 8.0 g/dL    Albumin 4.5 3.5 - 5.0 g/dL    Bilirubin Total 1.3 (H) 0.0 - 1.0 mg/dL    GFR Estimate 75 >60 mL/min/1.73m2   Result Value Ref Range    Lipase 26 0 - 52 U/L   CBC with platelets and differential   Result Value Ref Range    WBC Count 10.3 4.0 - 11.0 10e3/uL    RBC Count 5.95 (H) 4.40 - 5.90 10e6/uL    Hemoglobin 17.3 13.3 - 17.7 g/dL    Hematocrit 52.1 40.0 - 53.0 %    MCV 88 78 - 100 fL    MCH 29.1 26.5 - 33.0 pg    MCHC 33.2 31.5 - 36.5 g/dL    RDW 12.7 10.0 - 15.0 %    Platelet Count 237 150 - 450 10e3/uL    % Neutrophils 85 %    % Lymphocytes 10 %    % Monocytes 4 %    % Eosinophils 1 %    % Basophils 0 %    % Immature Granulocytes 0 %    NRBCs per 100 WBC 0 <1 /100    Absolute Neutrophils 8.6 (H) 1.6 - 8.3 10e3/uL    Absolute Lymphocytes 1.0 0.8 - 5.3 10e3/uL    Absolute Monocytes 0.5 0.0 - 1.3 10e3/uL    Absolute Eosinophils 0.1 0.0 - 0.7 10e3/uL    Absolute Basophils 0.0 0.0 - 0.2 10e3/uL    Absolute Immature Granulocytes 0.0 <=0.4 10e3/uL    Absolute NRBCs 0.0 10e3/uL       RADIOLOGY:  Reviewed all pertinent imaging. Please see official radiology report.  CT Abdomen Pelvis w Contrast   Final Result   IMPRESSION:    1.  Nondilated fluid containing small bowel could represent enteritis.   2.  Minimal right middle lobe groundglass opacity could represent atelectasis or very early pneumonia.   3.  Appendectomy.            Alina ZULETA am  serving as a scribe to document services personally performed by Hannah Michelle DO based on my observation and the provider's statements to me. I, Hannah Michelle, attest that Alina Vincent  is acting in a scribe capacity, has observed my performance of the services and has documented them in accordance with my direction.    Hannah Michelle DO  Emergency Medicine  [facility] EMERGENCY ROOM  [address] [id number] [phone]     Hannah Michelle DO  02/07/23 0924

## 2025-02-07 ENCOUNTER — LAB REQUISITION (OUTPATIENT)
Dept: LAB | Facility: CLINIC | Age: 40
End: 2025-02-07

## 2025-02-07 DIAGNOSIS — E66.811 OBESITY, CLASS 1: ICD-10-CM

## 2025-02-07 PROCEDURE — 80053 COMPREHEN METABOLIC PANEL: CPT | Performed by: FAMILY MEDICINE

## 2025-02-07 PROCEDURE — 84443 ASSAY THYROID STIM HORMONE: CPT | Performed by: FAMILY MEDICINE

## 2025-02-07 PROCEDURE — 83721 ASSAY OF BLOOD LIPOPROTEIN: CPT | Performed by: FAMILY MEDICINE

## 2025-02-07 PROCEDURE — 80061 LIPID PANEL: CPT | Performed by: FAMILY MEDICINE

## 2025-02-08 LAB
ALBUMIN SERPL BCG-MCNC: 4.6 G/DL (ref 3.5–5.2)
ALP SERPL-CCNC: 68 U/L (ref 40–150)
ALT SERPL W P-5'-P-CCNC: 56 U/L (ref 0–70)
ANION GAP SERPL CALCULATED.3IONS-SCNC: 20 MMOL/L (ref 7–15)
AST SERPL W P-5'-P-CCNC: 33 U/L (ref 0–45)
BILIRUB SERPL-MCNC: 0.5 MG/DL
BUN SERPL-MCNC: 17 MG/DL (ref 6–20)
CALCIUM SERPL-MCNC: 9.7 MG/DL (ref 8.8–10.4)
CHLORIDE SERPL-SCNC: 104 MMOL/L (ref 98–107)
CHOLEST SERPL-MCNC: 323 MG/DL
CREAT SERPL-MCNC: 1.26 MG/DL (ref 0.67–1.17)
EGFRCR SERPLBLD CKD-EPI 2021: 75 ML/MIN/1.73M2
FASTING STATUS PATIENT QL REPORTED: NO
FASTING STATUS PATIENT QL REPORTED: NO
GLUCOSE SERPL-MCNC: 121 MG/DL (ref 70–99)
HCO3 SERPL-SCNC: 17 MMOL/L (ref 22–29)
HDLC SERPL-MCNC: 32 MG/DL
LDLC SERPL CALC-MCNC: ABNORMAL MG/DL
LDLC SERPL DIRECT ASSAY-MCNC: 214 MG/DL
NONHDLC SERPL-MCNC: 291 MG/DL
POTASSIUM SERPL-SCNC: 4 MMOL/L (ref 3.4–5.3)
PROT SERPL-MCNC: 7.3 G/DL (ref 6.4–8.3)
SODIUM SERPL-SCNC: 141 MMOL/L (ref 135–145)
TRIGL SERPL-MCNC: 676 MG/DL
TSH SERPL DL<=0.005 MIU/L-ACNC: 0.83 UIU/ML (ref 0.3–4.2)